# Patient Record
Sex: FEMALE | URBAN - METROPOLITAN AREA
[De-identification: names, ages, dates, MRNs, and addresses within clinical notes are randomized per-mention and may not be internally consistent; named-entity substitution may affect disease eponyms.]

---

## 2017-05-09 ENCOUNTER — CHARTING TRANS (OUTPATIENT)
Dept: OCCUPATIONAL MEDICINE | Age: 32
End: 2017-05-09

## 2017-05-16 ENCOUNTER — CHARTING TRANS (OUTPATIENT)
Dept: OCCUPATIONAL MEDICINE | Age: 32
End: 2017-05-16

## 2017-05-23 ENCOUNTER — CHARTING TRANS (OUTPATIENT)
Dept: OCCUPATIONAL MEDICINE | Age: 32
End: 2017-05-23

## 2017-12-07 ENCOUNTER — APPOINTMENT (OUTPATIENT)
Dept: GENERAL RADIOLOGY | Age: 32
End: 2017-12-07
Attending: PHYSICIAN ASSISTANT
Payer: MEDICAID

## 2017-12-07 ENCOUNTER — HOSPITAL ENCOUNTER (OUTPATIENT)
Age: 32
Discharge: HOME | End: 2017-12-07
Payer: MEDICAID

## 2017-12-07 VITALS
DIASTOLIC BLOOD PRESSURE: 80 MMHG | SYSTOLIC BLOOD PRESSURE: 137 MMHG | TEMPERATURE: 98 F | OXYGEN SATURATION: 100 % | HEART RATE: 90 BPM | RESPIRATION RATE: 16 BRPM

## 2017-12-07 DIAGNOSIS — Z87.891 SMOKING HISTORY: ICD-10-CM

## 2017-12-07 DIAGNOSIS — M54.32 SCIATICA OF LEFT SIDE: Primary | ICD-10-CM

## 2017-12-07 DIAGNOSIS — J40 WHEEZY BRONCHITIS: ICD-10-CM

## 2017-12-07 DIAGNOSIS — J30.9 ALLERGIC RHINITIS, UNSPECIFIED CHRONICITY, UNSPECIFIED SEASONALITY, UNSPECIFIED TRIGGER: ICD-10-CM

## 2017-12-07 PROCEDURE — 81002 URINALYSIS NONAUTO W/O SCOPE: CPT | Performed by: PHYSICIAN ASSISTANT

## 2017-12-07 PROCEDURE — 99204 OFFICE O/P NEW MOD 45 MIN: CPT

## 2017-12-07 PROCEDURE — 96372 THER/PROPH/DIAG INJ SC/IM: CPT

## 2017-12-07 PROCEDURE — 94640 AIRWAY INHALATION TREATMENT: CPT

## 2017-12-07 PROCEDURE — 81025 URINE PREGNANCY TEST: CPT | Performed by: PHYSICIAN ASSISTANT

## 2017-12-07 PROCEDURE — 71020 XR CHEST PA + LAT CHEST (CPT=71020): CPT | Performed by: PHYSICIAN ASSISTANT

## 2017-12-07 RX ORDER — ALBUTEROL SULFATE 90 UG/1
2 AEROSOL, METERED RESPIRATORY (INHALATION) EVERY 4 HOURS PRN
Qty: 1 INHALER | Refills: 0 | Status: SHIPPED | OUTPATIENT
Start: 2017-12-07 | End: 2018-01-06

## 2017-12-07 RX ORDER — CYCLOBENZAPRINE HCL 10 MG
10 TABLET ORAL 3 TIMES DAILY PRN
Qty: 20 TABLET | Refills: 0 | Status: SHIPPED | OUTPATIENT
Start: 2017-12-07 | End: 2018-02-21 | Stop reason: ALTCHOICE

## 2017-12-07 RX ORDER — NAPROXEN 500 MG/1
500 TABLET ORAL 2 TIMES DAILY PRN
Qty: 20 TABLET | Refills: 0 | Status: SHIPPED | OUTPATIENT
Start: 2017-12-07 | End: 2017-12-14

## 2017-12-07 RX ORDER — DEXAMETHASONE SODIUM PHOSPHATE 4 MG/ML
10 VIAL (ML) INJECTION ONCE
Status: COMPLETED | OUTPATIENT
Start: 2017-12-07 | End: 2017-12-07

## 2017-12-07 RX ORDER — PREDNISONE 20 MG/1
40 TABLET ORAL DAILY
Qty: 10 TABLET | Refills: 0 | Status: SHIPPED | OUTPATIENT
Start: 2017-12-07 | End: 2017-12-12

## 2017-12-07 RX ORDER — KETOROLAC TROMETHAMINE 30 MG/ML
60 INJECTION, SOLUTION INTRAMUSCULAR; INTRAVENOUS ONCE
Status: COMPLETED | OUTPATIENT
Start: 2017-12-07 | End: 2017-12-07

## 2017-12-07 RX ORDER — IPRATROPIUM BROMIDE AND ALBUTEROL SULFATE 2.5; .5 MG/3ML; MG/3ML
3 SOLUTION RESPIRATORY (INHALATION) ONCE
Status: DISCONTINUED | OUTPATIENT
Start: 2017-12-07 | End: 2017-12-07

## 2017-12-07 RX ORDER — ALBUTEROL SULFATE 2.5 MG/3ML
2.5 SOLUTION RESPIRATORY (INHALATION) ONCE
Status: DISCONTINUED | OUTPATIENT
Start: 2017-12-07 | End: 2017-12-07

## 2017-12-07 RX ORDER — IPRATROPIUM BROMIDE AND ALBUTEROL SULFATE 2.5; .5 MG/3ML; MG/3ML
3 SOLUTION RESPIRATORY (INHALATION) ONCE
Status: COMPLETED | OUTPATIENT
Start: 2017-12-07 | End: 2017-12-07

## 2017-12-07 NOTE — ED PROVIDER NOTES
Patient Seen in: [id number] [facility]    History   Patient presents with:  Hip Pain  Cough/URI    Stated Complaint: HIP PAIN  AND COUGHING    HPI    28-year-old female here with multiple complaints:  Patient complains of a pain from the left b Physical Exam   Constitutional: She is oriented to person, place, and time. She appears well-developed and well-nourished. HENT:   Head: Normocephalic and atraumatic.    Right Ear: Tympanic membrane, external ear and ear canal normal.   Left Ear: within normal limits. No pleural effusions. No pneumothorax. Minimal linear atelectasis left lung base. CONCLUSION:  Minimal linear atelectasis left lung base.     Dictated by: Malachi Holstein, MD on 12/07/2017 at 16:14     Approved by: Malachi Holstein List    START taking these medications    Albuterol Sulfate  (90 Base) MCG/ACT Inhalation Aero Soln  Inhale 2 puffs into the lungs every 4 (four) hours as needed for Wheezing.   Qty: 1 Inhaler Refills: 0    predniSONE 20 MG Oral Tab  Take 2 tablets (

## 2017-12-07 NOTE — ED INITIAL ASSESSMENT (HPI)
Left hip pain since last Saturday. States sit woke her up from sleep. States cannot get comfortable. Cough x 2 weeks. No fever. Some nausea and diarrhea.

## 2018-02-21 ENCOUNTER — HOSPITAL ENCOUNTER (OUTPATIENT)
Age: 33
Discharge: HOME | End: 2018-02-21
Payer: MEDICAID

## 2018-02-21 ENCOUNTER — APPOINTMENT (OUTPATIENT)
Dept: GENERAL RADIOLOGY | Age: 33
End: 2018-02-21
Attending: PHYSICIAN ASSISTANT
Payer: MEDICAID

## 2018-02-21 VITALS
HEART RATE: 102 BPM | WEIGHT: 190 LBS | RESPIRATION RATE: 16 BRPM | TEMPERATURE: 98 F | OXYGEN SATURATION: 99 % | DIASTOLIC BLOOD PRESSURE: 76 MMHG | SYSTOLIC BLOOD PRESSURE: 112 MMHG

## 2018-02-21 DIAGNOSIS — J06.9 UPPER RESPIRATORY TRACT INFECTION, UNSPECIFIED TYPE: Primary | ICD-10-CM

## 2018-02-21 PROCEDURE — 71046 X-RAY EXAM CHEST 2 VIEWS: CPT | Performed by: PHYSICIAN ASSISTANT

## 2018-02-21 PROCEDURE — 99213 OFFICE O/P EST LOW 20 MIN: CPT

## 2018-02-21 PROCEDURE — 99214 OFFICE O/P EST MOD 30 MIN: CPT

## 2018-02-21 RX ORDER — ACETAMINOPHEN 325 MG/1
650 TABLET ORAL ONCE
Status: COMPLETED | OUTPATIENT
Start: 2018-02-21 | End: 2018-02-21

## 2018-02-21 RX ORDER — PREDNISONE 20 MG/1
40 TABLET ORAL DAILY
Qty: 10 TABLET | Refills: 0 | Status: SHIPPED | OUTPATIENT
Start: 2018-02-21 | End: 2018-02-26

## 2018-02-21 RX ORDER — BENZONATATE 100 MG/1
100 CAPSULE ORAL 3 TIMES DAILY PRN
Qty: 30 CAPSULE | Refills: 0 | Status: SHIPPED | OUTPATIENT
Start: 2018-02-21 | End: 2018-03-23

## 2018-02-21 RX ORDER — ALBUTEROL SULFATE 90 UG/1
2 AEROSOL, METERED RESPIRATORY (INHALATION) EVERY 4 HOURS PRN
Qty: 1 INHALER | Refills: 0 | Status: SHIPPED | OUTPATIENT
Start: 2018-02-21 | End: 2018-03-23

## 2018-02-21 NOTE — ED PROVIDER NOTES
Patient Seen in: [id number] [facility]    History   Patient presents with:  Cough/URI  Body ache and/or chills    Stated Complaint: coughing sore throat    HPI    Patient is a 28-year-old female. Patient is a daily smoker.   For the past week, appreciable  Extremities: Full ROM, no deformity, NVI  Back: Full range of motion  Skin: No sign of trauma, Skin warm and dry, no induration or sign of infection. Neuro: Cranial nerves intact, Normal Gait.     ED Course   Labs Reviewed - No data to displa Prescribed:  Current Discharge Medication List    START taking these medications    predniSONE 20 MG Oral Tab  Take 2 tablets (40 mg total) by mouth daily.   Qty: 10 tablet Refills: 0    Albuterol Sulfate  (90 Base) MCG/ACT Inhalation Aero Soln  [employer]

## 2018-11-28 VITALS
HEART RATE: 75 BPM | SYSTOLIC BLOOD PRESSURE: 119 MMHG | DIASTOLIC BLOOD PRESSURE: 85 MMHG | BODY MASS INDEX: 30.53 KG/M2 | WEIGHT: 190 LBS | HEIGHT: 66 IN | TEMPERATURE: 96.4 F

## 2018-11-28 VITALS — SYSTOLIC BLOOD PRESSURE: 110 MMHG | DIASTOLIC BLOOD PRESSURE: 80 MMHG | HEART RATE: 80 BPM | TEMPERATURE: 97 F

## 2018-11-28 VITALS
DIASTOLIC BLOOD PRESSURE: 67 MMHG | TEMPERATURE: 97.5 F | SYSTOLIC BLOOD PRESSURE: 103 MMHG | RESPIRATION RATE: 16 BRPM | HEART RATE: 80 BPM

## 2024-02-28 ENCOUNTER — APPOINTMENT (OUTPATIENT)
Dept: GENERAL RADIOLOGY | Age: 39
End: 2024-02-28
Attending: PHYSICIAN ASSISTANT
Payer: MEDICAID

## 2024-02-28 ENCOUNTER — HOSPITAL ENCOUNTER (OUTPATIENT)
Age: 39
Discharge: HOME | End: 2024-02-28
Payer: MEDICAID

## 2024-02-28 VITALS
SYSTOLIC BLOOD PRESSURE: 120 MMHG | RESPIRATION RATE: 18 BRPM | DIASTOLIC BLOOD PRESSURE: 91 MMHG | BODY MASS INDEX: 41.78 KG/M2 | WEIGHT: 260 LBS | OXYGEN SATURATION: 97 % | HEART RATE: 102 BPM | HEIGHT: 66 IN | TEMPERATURE: 97 F

## 2024-02-28 DIAGNOSIS — M54.16 LUMBAR RADICULOPATHY: Primary | ICD-10-CM

## 2024-02-28 DIAGNOSIS — F41.9 ANXIETY: ICD-10-CM

## 2024-02-28 DIAGNOSIS — M25.551 PAIN OF RIGHT HIP: ICD-10-CM

## 2024-02-28 PROCEDURE — 99213 OFFICE O/P EST LOW 20 MIN: CPT | Performed by: PHYSICIAN ASSISTANT

## 2024-02-28 RX ORDER — MIRTAZAPINE 15 MG/1
15 TABLET, FILM COATED ORAL NIGHTLY
COMMUNITY
Start: 2024-02-10

## 2024-02-28 RX ORDER — VENLAFAXINE HYDROCHLORIDE 37.5 MG/1
37.5 CAPSULE, EXTENDED RELEASE ORAL
COMMUNITY
Start: 2024-02-10

## 2024-02-28 RX ORDER — LAMOTRIGINE 200 MG/1
200 TABLET ORAL DAILY
COMMUNITY
Start: 2024-02-10

## 2024-02-28 RX ORDER — DIVALPROEX SODIUM 500 MG/1
500 TABLET, EXTENDED RELEASE ORAL DAILY
COMMUNITY
Start: 2023-09-14

## 2024-02-28 RX ORDER — LAMOTRIGINE 100 MG/1
100 TABLET ORAL DAILY
COMMUNITY
Start: 2024-01-04

## 2024-02-28 RX ORDER — NAPROXEN 500 MG/1
500 TABLET ORAL 2 TIMES DAILY PRN
Qty: 20 TABLET | Refills: 0 | Status: SHIPPED | OUTPATIENT
Start: 2024-02-28 | End: 2024-03-06

## 2024-02-28 RX ORDER — CYCLOBENZAPRINE HCL 10 MG
10 TABLET ORAL 3 TIMES DAILY PRN
Qty: 20 TABLET | Refills: 0 | Status: SHIPPED | OUTPATIENT
Start: 2024-02-28 | End: 2024-03-06

## 2024-02-28 RX ORDER — ALPRAZOLAM 2 MG/1
2 TABLET ORAL 3 TIMES DAILY PRN
COMMUNITY
Start: 2024-02-22

## 2024-02-28 RX ORDER — QUETIAPINE FUMARATE 400 MG/1
400 TABLET, FILM COATED ORAL NIGHTLY
COMMUNITY
Start: 2024-02-06

## 2024-02-28 NOTE — ED INITIAL ASSESSMENT (HPI)
Pt states she has had increased anxiety/ anxiety attacks for the last 2-3 days. Pt is out of her alprazolam and PMD will not refill. Pt has appt. With psychiatrist next week. Pt with additional c/o non traumatic right hip/back pain.

## 2024-02-28 NOTE — DISCHARGE INSTRUCTIONS
Take the muscle relaxant as needed- will make you drowsy  Take naproxen twice a day as needed for pain - take with food  Follow up with primary care doctor  Keep your appointment with psychiatrist

## 2024-02-28 NOTE — ED PROVIDER NOTES
Patient Seen in: [facility]      History     Chief Complaint   Patient presents with    Anxiety    Hip Pain     Stated Complaint: ANXIETY    Subjective:   The history is provided by the patient.       37 yo female with PMH of bipolar presents to the  due to low back pain x 1 week. The pain is mainly on the right low back and radiates into the hip/buttock. No injury or trauma. Remains ambulatory although painful. No fevers, abdominal pain, NVD, urinary complaints, urinary incontinence. Taking ibuprofen and tylenol without relief.     Pt also endorsing an increase in anxiety over the last 2-3 days. Pt previously prescribed alprazolam 2mg TID - previously prescribed by her primary care doctor however no longer will refill her medications and requesting her to follow up with a psychiatrist. Does have an appointment next week. Denies any SI/HI or hallucinations.     Objective:   Past Medical History:   Diagnosis Date    Anxiety     Bipolar affective (HCC)               Past Surgical History:   Procedure Laterality Date    REMOVAL GALLBLADDER      TUBAL LIGATION                  Social History     Socioeconomic History    Marital status: Single   Tobacco Use    Smoking status: Every Day    Smokeless tobacco: Never              Review of Systems   Constitutional: Negative.    Respiratory: Negative.     Cardiovascular: Negative.    Genitourinary: Negative.    Musculoskeletal:  Positive for back pain. Negative for gait problem.   Psychiatric/Behavioral:  Negative for agitation, confusion, hallucinations and suicidal ideas. The patient is nervous/anxious.        Positive for stated complaint: ANXIETY  Other systems are as noted in HPI.  Constitutional and vital signs reviewed.      All other systems reviewed and negative except as noted above.    Physical Exam     ED Triage Vitals   BP 02/28/24 1657 (!) 120/91   Pulse 02/28/24 1657 102   Resp 02/28/24 1657 18   Temp 02/28/24 1657 96.9 °F (36.1 °C)   Temp src  02/28/24 1657 Temporal   SpO2 02/28/24 1657 97 %   O2 Device 02/28/24 1646 None (Room air)       Current:BP (!) 120/91   Pulse 102   Temp 96.9 °F (36.1 °C) (Temporal)   Resp 18   Ht 167.6 cm (5' 6\")   Wt 117.9 kg   SpO2 97%   BMI 41.97 kg/m²         Physical Exam  Vitals and nursing note reviewed.   Constitutional:       General: She is not in acute distress.     Appearance: Normal appearance. She is not toxic-appearing.   HENT:      Head: Normocephalic.   Cardiovascular:      Rate and Rhythm: Normal rate and regular rhythm.   Pulmonary:      Effort: Pulmonary effort is normal.      Breath sounds: Normal breath sounds.   Musculoskeletal:      Comments: No tenderness over lumbar spinous process. Tenderness with spasm over the right paralumbar muscles. She also has tenderness over the piriformis. Full ROM of the hip. No saddle anesthesia. Ambulating with steady gait   Skin:     General: Skin is warm.      Comments: No vesicular rash    Neurological:      General: No focal deficit present.      Mental Status: She is alert and oriented to person, place, and time.      Cranial Nerves: No cranial nerve deficit.      Motor: No weakness.      Gait: Gait normal.   Psychiatric:         Mood and Affect: Mood normal.         Behavior: Behavior normal.               ED Course   Labs Reviewed - No data to display                   MDM   Ddx- lumbar strain, piriformis syndrome, cuada equina       On exam the patient is in no acute distress. VSS. Tenderness over the right paralumbar muscles and piriformis muscle without vesicular rash. No concern for cuada equina. Exam is consistent with lumbar strain/lumbar radiculopathy. Will rx flexiril and naproxen. Discussed with patient at home care, strict return precautions and importance of close follow up        Pt was also requesting refill of her alprazolam - already has a pending psychiatrist appointment - currently denies HI, SI. PCP no longer refilling this prescription per  patient. Pt is in no acute distress at this time, I advised we do not refill benzo prescriptions. Did offer vincent grullon for follow up however patient states that she actually lives roughly an hour away therefore she will continue to follow up with her previously scheduled psychiatrist.                      Medical Decision Making  Problems Addressed:  Anxiety: acute illness or injury  Lumbar radiculopathy: acute illness or injury    Risk  OTC drugs.  Prescription drug management.        Disposition and Plan     Clinical Impression:  1. Lumbar radiculopathy    2. Pain of right hip    3. Anxiety         Disposition:  Discharge  2/28/2024  5:22 pm    Follow-up:  Kaylin Howard  [address] [facility]  [address] [place] [id number]  [phone]    Schedule an appointment as soon as possible for a visit in 2 days  recheck          Medications Prescribed:  Discharge Medication List as of 2/28/2024  5:26 PM        START taking these medications    Details   naproxen 500 MG Oral Tab Take 1 tablet (500 mg total) by mouth 2 (two) times daily as needed., Print, Disp-20 tablet, R-0      cyclobenzaprine 10 MG Oral Tab Take 1 tablet (10 mg total) by mouth 3 (three) times daily as needed for Muscle spasms., Print, Disp-20 tablet, R-0

## 2024-09-24 PROCEDURE — 93010 ELECTROCARDIOGRAM REPORT: CPT | Performed by: INTERNAL MEDICINE

## (undated) NOTE — LETTER
[facility] IN [place]  [address] [id number]  Dept: [phone]  Dept Fax: [phone]      December 7, 2017    Patient: Brittany Thierry   Date of Visit: 12/7/2017       To Whom It May Concern:    Toby Wise was seen and ras